# Patient Record
Sex: FEMALE | Race: WHITE | Employment: UNEMPLOYED | URBAN - METROPOLITAN AREA
[De-identification: names, ages, dates, MRNs, and addresses within clinical notes are randomized per-mention and may not be internally consistent; named-entity substitution may affect disease eponyms.]

---

## 2018-07-03 ENCOUNTER — HOSPITAL ENCOUNTER (INPATIENT)
Age: 63
LOS: 1 days | Discharge: HOME HEALTH | DRG: 811 | End: 2018-07-04
Attending: INTERNAL MEDICINE | Admitting: INTERNAL MEDICINE
Payer: COMMERCIAL

## 2018-07-03 ENCOUNTER — OFFICE VISIT (OUTPATIENT)
Dept: ENDOCRINOLOGY | Age: 63
End: 2018-07-03
Payer: COMMERCIAL

## 2018-07-03 VITALS
DIASTOLIC BLOOD PRESSURE: 52 MMHG | HEART RATE: 89 BPM | SYSTOLIC BLOOD PRESSURE: 100 MMHG | HEIGHT: 63 IN | BODY MASS INDEX: 19.14 KG/M2 | WEIGHT: 108 LBS

## 2018-07-03 DIAGNOSIS — R26.2 IMPAIRED AMBULATION: ICD-10-CM

## 2018-07-03 DIAGNOSIS — R26.81 GAIT INSTABILITY: ICD-10-CM

## 2018-07-03 DIAGNOSIS — C34.91 STAGE 4 MALIGNANT NEOPLASM OF LUNG, RIGHT (HCC): ICD-10-CM

## 2018-07-03 DIAGNOSIS — K59.03 CONSTIPATION DUE TO OPIOID THERAPY: ICD-10-CM

## 2018-07-03 DIAGNOSIS — T45.1X5S ADVERSE EFFECT OF CHEMOTHERAPY, SEQUELA: ICD-10-CM

## 2018-07-03 DIAGNOSIS — D50.8 IRON DEFICIENCY ANEMIA SECONDARY TO INADEQUATE DIETARY IRON INTAKE: ICD-10-CM

## 2018-07-03 DIAGNOSIS — G89.3 CANCER ASSOCIATED PAIN: Chronic | ICD-10-CM

## 2018-07-03 DIAGNOSIS — I47.1 PAROXYSMAL SVT (SUPRAVENTRICULAR TACHYCARDIA) (HCC): ICD-10-CM

## 2018-07-03 DIAGNOSIS — E05.90 HYPERTHYROIDISM: Primary | ICD-10-CM

## 2018-07-03 DIAGNOSIS — R52 SEVERE PAIN: ICD-10-CM

## 2018-07-03 DIAGNOSIS — M17.0 PRIMARY OSTEOARTHRITIS OF BOTH KNEES: Chronic | ICD-10-CM

## 2018-07-03 DIAGNOSIS — R11.0 CHRONIC NAUSEA: Chronic | ICD-10-CM

## 2018-07-03 DIAGNOSIS — D63.0 ANEMIA IN NEOPLASTIC DISEASE: ICD-10-CM

## 2018-07-03 DIAGNOSIS — J43.9 PULMONARY EMPHYSEMA, UNSPECIFIED EMPHYSEMA TYPE (HCC): ICD-10-CM

## 2018-07-03 DIAGNOSIS — T40.2X5A CONSTIPATION DUE TO OPIOID THERAPY: ICD-10-CM

## 2018-07-03 DIAGNOSIS — R62.7 FAILURE TO THRIVE IN ADULT: ICD-10-CM

## 2018-07-03 DIAGNOSIS — G25.81 IRON DEFICIENCY ASSOCIATED WITH NONFAMILIAL RESTLESS LEGS SYNDROME: Chronic | ICD-10-CM

## 2018-07-03 DIAGNOSIS — R73.9 HYPERGLYCEMIA: ICD-10-CM

## 2018-07-03 DIAGNOSIS — E43 SEVERE PROTEIN-CALORIE MALNUTRITION (HCC): Chronic | ICD-10-CM

## 2018-07-03 DIAGNOSIS — C34.91 PRIMARY LUNG CANCER WITH METASTASIS FROM LUNG TO OTHER SITE, RIGHT (HCC): ICD-10-CM

## 2018-07-03 DIAGNOSIS — C34.91 STAGE 4 MALIGNANT NEOPLASM OF LUNG, RIGHT (HCC): Primary | ICD-10-CM

## 2018-07-03 DIAGNOSIS — D64.9 SYMPTOMATIC ANEMIA: ICD-10-CM

## 2018-07-03 DIAGNOSIS — E44.0 MODERATE PROTEIN-CALORIE MALNUTRITION (HCC): ICD-10-CM

## 2018-07-03 DIAGNOSIS — E61.1 IRON DEFICIENCY ASSOCIATED WITH NONFAMILIAL RESTLESS LEGS SYNDROME: Chronic | ICD-10-CM

## 2018-07-03 DIAGNOSIS — G89.29 OTHER CHRONIC PAIN: ICD-10-CM

## 2018-07-03 PROBLEM — Z51.11 ENCOUNTER FOR ANTINEOPLASTIC CHEMOTHERAPY: Status: ACTIVE | Noted: 2018-04-13

## 2018-07-03 LAB
ALBUMIN SERPL-MCNC: 3 G/DL (ref 3.9–4.9)
ALP BLD-CCNC: 82 U/L (ref 40–130)
ALT SERPL-CCNC: 13 U/L (ref 0–33)
ANION GAP SERPL CALCULATED.3IONS-SCNC: 18 MEQ/L (ref 7–13)
AST SERPL-CCNC: 20 U/L (ref 0–35)
BILIRUB SERPL-MCNC: <0.2 MG/DL (ref 0–1.2)
BUN BLDV-MCNC: 20 MG/DL (ref 8–23)
CALCIUM SERPL-MCNC: 9.3 MG/DL (ref 8.6–10.2)
CHLORIDE BLD-SCNC: 93 MEQ/L (ref 98–107)
CO2: 24 MEQ/L (ref 22–29)
CREAT SERPL-MCNC: 0.82 MG/DL (ref 0.5–0.9)
FOLATE: >20 NG/ML (ref 7.3–26.1)
GFR AFRICAN AMERICAN: >60
GFR NON-AFRICAN AMERICAN: >60
GLOBULIN: 3.8 G/DL (ref 2.3–3.5)
GLUCOSE BLD-MCNC: 127 MG/DL (ref 74–109)
GLUCOSE BLD-MCNC: 130 MG/DL (ref 60–115)
GLUCOSE BLD-MCNC: 157 MG/DL (ref 60–115)
HBA1C MFR BLD: 5.8 % (ref 4.8–5.9)
HBA1C MFR BLD: 5.8 % (ref 4.8–5.9)
IRON SATURATION: 8 % (ref 11–46)
IRON: 16 UG/DL (ref 37–145)
LACTIC ACID: 1.3 MMOL/L (ref 0.5–2.2)
MAGNESIUM: 1.6 MG/DL (ref 1.7–2.3)
PERFORMED ON: ABNORMAL
PERFORMED ON: ABNORMAL
POTASSIUM SERPL-SCNC: 3.6 MEQ/L (ref 3.5–5.1)
PREALBUMIN: 16.1 MG/DL (ref 20–40)
SODIUM BLD-SCNC: 135 MEQ/L (ref 132–144)
T3 FREE: 2.4 PG/ML (ref 2–4.4)
T4 FREE: 1.67 NG/DL (ref 0.93–1.7)
TOTAL IRON BINDING CAPACITY: 192 UG/DL (ref 178–450)
TOTAL PROTEIN: 6.8 G/DL (ref 6.4–8.1)
TSH REFLEX: 0.27 UIU/ML (ref 0.27–4.2)
VITAMIN B-12: 1436 PG/ML (ref 232–1245)
VITAMIN D 25-HYDROXY: 36.4 NG/ML (ref 30–100)

## 2018-07-03 PROCEDURE — 36415 COLL VENOUS BLD VENIPUNCTURE: CPT

## 2018-07-03 PROCEDURE — 82746 ASSAY OF FOLIC ACID SERUM: CPT

## 2018-07-03 PROCEDURE — 84481 FREE ASSAY (FT-3): CPT

## 2018-07-03 PROCEDURE — 6360000002 HC RX W HCPCS: Performed by: INTERNAL MEDICINE

## 2018-07-03 PROCEDURE — 84439 ASSAY OF FREE THYROXINE: CPT

## 2018-07-03 PROCEDURE — 84134 ASSAY OF PREALBUMIN: CPT

## 2018-07-03 PROCEDURE — G8420 CALC BMI NORM PARAMETERS: HCPCS | Performed by: PHYSICIAN ASSISTANT

## 2018-07-03 PROCEDURE — 6370000000 HC RX 637 (ALT 250 FOR IP): Performed by: INTERNAL MEDICINE

## 2018-07-03 PROCEDURE — 86800 THYROGLOBULIN ANTIBODY: CPT

## 2018-07-03 PROCEDURE — 80053 COMPREHEN METABOLIC PANEL: CPT

## 2018-07-03 PROCEDURE — 83036 HEMOGLOBIN GLYCOSYLATED A1C: CPT

## 2018-07-03 PROCEDURE — 1210000000 HC MED SURG R&B

## 2018-07-03 PROCEDURE — 84432 ASSAY OF THYROGLOBULIN: CPT

## 2018-07-03 PROCEDURE — 2580000003 HC RX 258: Performed by: INTERNAL MEDICINE

## 2018-07-03 PROCEDURE — 82607 VITAMIN B-12: CPT

## 2018-07-03 PROCEDURE — 2500000003 HC RX 250 WO HCPCS: Performed by: INTERNAL MEDICINE

## 2018-07-03 PROCEDURE — 99204 OFFICE O/P NEW MOD 45 MIN: CPT | Performed by: PHYSICIAN ASSISTANT

## 2018-07-03 PROCEDURE — 83550 IRON BINDING TEST: CPT

## 2018-07-03 PROCEDURE — 83605 ASSAY OF LACTIC ACID: CPT

## 2018-07-03 PROCEDURE — 82306 VITAMIN D 25 HYDROXY: CPT

## 2018-07-03 PROCEDURE — G8427 DOCREV CUR MEDS BY ELIG CLIN: HCPCS | Performed by: PHYSICIAN ASSISTANT

## 2018-07-03 PROCEDURE — 84482 T3 REVERSE: CPT

## 2018-07-03 PROCEDURE — 83540 ASSAY OF IRON: CPT

## 2018-07-03 PROCEDURE — 83735 ASSAY OF MAGNESIUM: CPT

## 2018-07-03 PROCEDURE — 84443 ASSAY THYROID STIM HORMONE: CPT

## 2018-07-03 PROCEDURE — 94664 DEMO&/EVAL PT USE INHALER: CPT

## 2018-07-03 PROCEDURE — 3017F COLORECTAL CA SCREEN DOC REV: CPT | Performed by: PHYSICIAN ASSISTANT

## 2018-07-03 RX ORDER — POTASSIUM CHLORIDE 7.45 MG/ML
10 INJECTION INTRAVENOUS PRN
Status: DISCONTINUED | OUTPATIENT
Start: 2018-07-03 | End: 2018-07-04 | Stop reason: HOSPADM

## 2018-07-03 RX ORDER — OMEGA-3 FATTY ACIDS CAP DELAYED RELEASE 1000 MG 1000 MG
1000 CAPSULE DELAYED RELEASE ORAL DAILY
COMMUNITY

## 2018-07-03 RX ORDER — SENNA AND DOCUSATE SODIUM 50; 8.6 MG/1; MG/1
1 TABLET, FILM COATED ORAL DAILY
Status: DISCONTINUED | OUTPATIENT
Start: 2018-07-03 | End: 2018-07-04 | Stop reason: HOSPADM

## 2018-07-03 RX ORDER — MAGNESIUM SULFATE 1 G/100ML
1 INJECTION INTRAVENOUS PRN
Status: DISCONTINUED | OUTPATIENT
Start: 2018-07-03 | End: 2018-07-04 | Stop reason: HOSPADM

## 2018-07-03 RX ORDER — MAGNESIUM OXIDE 400 MG/1
400 TABLET ORAL DAILY
Status: ON HOLD | COMMUNITY
End: 2018-07-04 | Stop reason: HOSPADM

## 2018-07-03 RX ORDER — DEXTROSE MONOHYDRATE 50 MG/ML
100 INJECTION, SOLUTION INTRAVENOUS PRN
Status: DISCONTINUED | OUTPATIENT
Start: 2018-07-03 | End: 2018-07-04 | Stop reason: HOSPADM

## 2018-07-03 RX ORDER — MORPHINE SULFATE 2 MG/ML
2 INJECTION, SOLUTION INTRAMUSCULAR; INTRAVENOUS EVERY 4 HOURS PRN
Status: ACTIVE | OUTPATIENT
Start: 2018-07-03 | End: 2018-07-04

## 2018-07-03 RX ORDER — PANTOPRAZOLE SODIUM 40 MG/1
40 TABLET, DELAYED RELEASE ORAL DAILY
COMMUNITY

## 2018-07-03 RX ORDER — METHIMAZOLE 5 MG/1
5 TABLET ORAL DAILY
Qty: 30 TABLET | Refills: 4 | Status: CANCELLED | OUTPATIENT
Start: 2018-07-03 | End: 2018-08-02

## 2018-07-03 RX ORDER — MAGNESIUM SULFATE IN WATER 40 MG/ML
4 INJECTION, SOLUTION INTRAVENOUS ONCE
Status: COMPLETED | OUTPATIENT
Start: 2018-07-04 | End: 2018-07-04

## 2018-07-03 RX ORDER — POTASSIUM CHLORIDE 20MEQ/15ML
40 LIQUID (ML) ORAL PRN
Status: DISCONTINUED | OUTPATIENT
Start: 2018-07-03 | End: 2018-07-04 | Stop reason: HOSPADM

## 2018-07-03 RX ORDER — NICOTINE POLACRILEX 4 MG
15 LOZENGE BUCCAL PRN
Status: DISCONTINUED | OUTPATIENT
Start: 2018-07-03 | End: 2018-07-04 | Stop reason: HOSPADM

## 2018-07-03 RX ORDER — CHLORAL HYDRATE 500 MG
1000 CAPSULE ORAL DAILY
Status: DISCONTINUED | OUTPATIENT
Start: 2018-07-03 | End: 2018-07-04 | Stop reason: HOSPADM

## 2018-07-03 RX ORDER — DEXAMETHASONE 4 MG/1
4 TABLET ORAL 2 TIMES DAILY WITH MEALS
COMMUNITY

## 2018-07-03 RX ORDER — POTASSIUM CHLORIDE 20 MEQ/1
40 TABLET, EXTENDED RELEASE ORAL PRN
Status: DISCONTINUED | OUTPATIENT
Start: 2018-07-03 | End: 2018-07-04 | Stop reason: HOSPADM

## 2018-07-03 RX ORDER — MORPHINE SULFATE 4 MG/ML
4 INJECTION, SOLUTION INTRAMUSCULAR; INTRAVENOUS EVERY 4 HOURS PRN
Status: DISCONTINUED | OUTPATIENT
Start: 2018-07-03 | End: 2018-07-04 | Stop reason: HOSPADM

## 2018-07-03 RX ORDER — OXYCODONE HYDROCHLORIDE AND ACETAMINOPHEN 5; 325 MG/1; MG/1
2 TABLET ORAL EVERY 4 HOURS PRN
Status: DISCONTINUED | OUTPATIENT
Start: 2018-07-03 | End: 2018-07-04 | Stop reason: HOSPADM

## 2018-07-03 RX ORDER — ONDANSETRON 2 MG/ML
4 INJECTION INTRAMUSCULAR; INTRAVENOUS EVERY 6 HOURS PRN
Status: DISCONTINUED | OUTPATIENT
Start: 2018-07-03 | End: 2018-07-04 | Stop reason: HOSPADM

## 2018-07-03 RX ORDER — OXYCODONE HYDROCHLORIDE AND ACETAMINOPHEN 5; 325 MG/1; MG/1
1 TABLET ORAL EVERY 6 HOURS
Status: DISCONTINUED | OUTPATIENT
Start: 2018-07-03 | End: 2018-07-04 | Stop reason: HOSPADM

## 2018-07-03 RX ORDER — SODIUM CHLORIDE 0.9 % (FLUSH) 0.9 %
10 SYRINGE (ML) INJECTION PRN
Status: DISCONTINUED | OUTPATIENT
Start: 2018-07-03 | End: 2018-07-04 | Stop reason: HOSPADM

## 2018-07-03 RX ORDER — LACTULOSE 10 G/15ML
30 SOLUTION ORAL ONCE
Status: COMPLETED | OUTPATIENT
Start: 2018-07-03 | End: 2018-07-03

## 2018-07-03 RX ORDER — UREA 10 %
2 LOTION (ML) TOPICAL NIGHTLY
Status: DISCONTINUED | OUTPATIENT
Start: 2018-07-03 | End: 2018-07-04 | Stop reason: HOSPADM

## 2018-07-03 RX ORDER — IPRATROPIUM BROMIDE AND ALBUTEROL SULFATE 2.5; .5 MG/3ML; MG/3ML
1 SOLUTION RESPIRATORY (INHALATION) EVERY 4 HOURS PRN
Status: DISCONTINUED | OUTPATIENT
Start: 2018-07-03 | End: 2018-07-04 | Stop reason: HOSPADM

## 2018-07-03 RX ORDER — METOCLOPRAMIDE HYDROCHLORIDE 5 MG/ML
10 INJECTION INTRAMUSCULAR; INTRAVENOUS EVERY 6 HOURS PRN
Status: DISCONTINUED | OUTPATIENT
Start: 2018-07-03 | End: 2018-07-03

## 2018-07-03 RX ORDER — METOCLOPRAMIDE 10 MG/1
10 TABLET ORAL
Status: DISCONTINUED | OUTPATIENT
Start: 2018-07-03 | End: 2018-07-04 | Stop reason: HOSPADM

## 2018-07-03 RX ORDER — PROCHLORPERAZINE MALEATE 10 MG
10 TABLET ORAL EVERY 6 HOURS PRN
Status: ON HOLD | COMMUNITY
End: 2018-07-04 | Stop reason: HOSPADM

## 2018-07-03 RX ORDER — DEXTROSE MONOHYDRATE 25 G/50ML
12.5 INJECTION, SOLUTION INTRAVENOUS PRN
Status: DISCONTINUED | OUTPATIENT
Start: 2018-07-03 | End: 2018-07-04 | Stop reason: HOSPADM

## 2018-07-03 RX ORDER — THIAMINE HCL 100 MG
2500 TABLET ORAL DAILY
Status: DISCONTINUED | OUTPATIENT
Start: 2018-07-04 | End: 2018-07-04 | Stop reason: HOSPADM

## 2018-07-03 RX ORDER — DRONABINOL 2.5 MG/1
5 CAPSULE ORAL 2 TIMES DAILY
Status: DISCONTINUED | OUTPATIENT
Start: 2018-07-03 | End: 2018-07-04 | Stop reason: HOSPADM

## 2018-07-03 RX ORDER — FOLIC ACID 1 MG/1
1 TABLET ORAL DAILY
COMMUNITY

## 2018-07-03 RX ORDER — FOLIC ACID 1 MG/1
1 TABLET ORAL 2 TIMES DAILY
Status: DISCONTINUED | OUTPATIENT
Start: 2018-07-04 | End: 2018-07-04 | Stop reason: HOSPADM

## 2018-07-03 RX ORDER — SODIUM CHLORIDE 0.9 % (FLUSH) 0.9 %
10 SYRINGE (ML) INJECTION EVERY 12 HOURS SCHEDULED
Status: DISCONTINUED | OUTPATIENT
Start: 2018-07-03 | End: 2018-07-04 | Stop reason: HOSPADM

## 2018-07-03 RX ORDER — POTASSIUM CHLORIDE 20 MEQ/1
40 TABLET, EXTENDED RELEASE ORAL ONCE
Status: COMPLETED | OUTPATIENT
Start: 2018-07-04 | End: 2018-07-04

## 2018-07-03 RX ORDER — PANTOPRAZOLE SODIUM 40 MG/1
40 TABLET, DELAYED RELEASE ORAL DAILY
Status: DISCONTINUED | OUTPATIENT
Start: 2018-07-03 | End: 2018-07-04 | Stop reason: HOSPADM

## 2018-07-03 RX ORDER — DRONABINOL 2.5 MG/1
2.5 CAPSULE ORAL 2 TIMES DAILY
Status: DISCONTINUED | OUTPATIENT
Start: 2018-07-03 | End: 2018-07-03

## 2018-07-03 RX ORDER — SODIUM CHLORIDE 9 MG/ML
INJECTION, SOLUTION INTRAVENOUS CONTINUOUS
Status: DISCONTINUED | OUTPATIENT
Start: 2018-07-03 | End: 2018-07-04 | Stop reason: HOSPADM

## 2018-07-03 RX ORDER — DEXAMETHASONE 4 MG/1
4 TABLET ORAL 2 TIMES DAILY WITH MEALS
Status: DISCONTINUED | OUTPATIENT
Start: 2018-07-03 | End: 2018-07-03

## 2018-07-03 RX ADMIN — DRONABINOL 5 MG: 2.5 CAPSULE ORAL at 20:55

## 2018-07-03 RX ADMIN — LACTULOSE 30 G: 20 SOLUTION ORAL at 20:54

## 2018-07-03 RX ADMIN — OXYCODONE HYDROCHLORIDE AND ACETAMINOPHEN 1 TABLET: 5; 325 TABLET ORAL at 17:54

## 2018-07-03 RX ADMIN — ASCORBIC ACID, VITAMIN A PALMITATE, CHOLECALCIFEROL, THIAMINE HYDROCHLORIDE, RIBOFLAVIN-5 PHOSPHATE SODIUM, PYRIDOXINE HYDROCHLORIDE, NIACINAMIDE, DEXPANTHENOL, ALPHA-TOCOPHEROL ACETATE, VITAMIN K1, FOLIC ACID, BIOTIN, CYANOCOBALAMIN: 200; 3300; 200; 6; 3.6; 6; 40; 15; 10; 150; 600; 60; 5 INJECTION, SOLUTION INTRAVENOUS at 20:55

## 2018-07-03 RX ADMIN — Medication 2 MG: at 20:55

## 2018-07-03 RX ADMIN — METOCLOPRAMIDE HYDROCHLORIDE 10 MG: 10 TABLET ORAL at 17:54

## 2018-07-03 RX ADMIN — SODIUM CHLORIDE: 9 INJECTION, SOLUTION INTRAVENOUS at 17:04

## 2018-07-03 RX ADMIN — Medication 10 ML: at 20:56

## 2018-07-03 RX ADMIN — SENNOSIDES AND DOCUSATE SODIUM 1 TABLET: 8.6; 5 TABLET ORAL at 17:21

## 2018-07-03 ASSESSMENT — ENCOUNTER SYMPTOMS
SINUS PRESSURE: 0
EYE PAIN: 0
WHEEZING: 0
SHORTNESS OF BREATH: 0
VOMITING: 0
TROUBLE SWALLOWING: 0
RHINORRHEA: 0
NAUSEA: 1
CONSTIPATION: 1
SORE THROAT: 0
EYE REDNESS: 0
DIARRHEA: 0
COUGH: 0
ABDOMINAL PAIN: 0

## 2018-07-03 ASSESSMENT — PAIN SCALES - GENERAL
PAINLEVEL_OUTOF10: 5
PAINLEVEL_OUTOF10: 3

## 2018-07-03 ASSESSMENT — PAIN DESCRIPTION - LOCATION: LOCATION: KNEE

## 2018-07-03 NOTE — PROGRESS NOTES
Physical Therapy Missed Treatment   Facility/Department: [facility] MED SURG [id number]    NAME: Coreen Jenkins    : 1955 (61 y.o.)  MRN: [id number]    Account: [de-identified]  Gender: female      PT evaluation and treatment orders received. Chart reviewed. PT evaluation attempted. Pt currently with MD at bedside for assessment. Will follow and attempt PT evaluation again at earliest availability.         Robert Herron, PT, 18 at 4:00 PM

## 2018-07-03 NOTE — H&P
Hospital Medicine  History and Physical    Patient:  Blue Gamez  MRN: [id number]    CHIEF COMPLAINT:  No chief complaint on file. History Obtained From:  Patient, EMR  Primary Care Physician: Vijaya Sol MD    HISTORY OF PRESENT ILLNESS:   The patient is a 61 y.o. female with PMH of tobacco use in remission, stage IV lung CA ( [place]) on chemotherapy, SVT, DJD who presents with profound fatigue and worsening generalized weakness, loss of appetite, unintentional weight loss, nausea with vomiting and severe debilitating cancer related pains that keep her awake at night. Per  pt has been increasingly fatigued, sleeping most of the day, unable to ambulate on her own without almost falling, reports being unsteady on her feet, has intractable nausea \"gagging\" off and on throughout the day and cannot keep Zofran down due to vomiting. Decreased PO intake with noted weight loss. Sees oncology team at [facility]. Had limited work up into symptoms and was recently diagnosed with hyperthyroidism for which she was referred to Endocrinology. She was seen by Endocrinology today morning and was supposed to have further testing as outpatient, but due to severity of symptoms (episodic dizziness with near syncope, profound fatigue, loss of appetite, weight loss and heart palpitations) which has greatly impacted her quality of life negatively, ongoing nausea and poor PO intake with weight loss, and decline in functional status, pt was brought into hospital for further evaluation. Goals of care discussed and improvement in quality of life is top priority. Has grandchildren she has not been able to spend time with due current health status. Reports that the chemo has been effective in treating her Lung CA but feels she cannot live her life or function. Past Medical History:      Diagnosis Date    Cancer [facility])     lung    SVT (supraventricular tachycardia) ([facility] [address]        Past Surgical History:  History reviewed.  No pertinent surgical history. Medications Prior to Admission:    Prior to Admission medications    Medication Sig Start Date End Date Taking? Authorizing Provider   dexamethasone (DECADRON) 4 MG tablet Take 4 mg by mouth 2 times daily (with meals)   Yes Historical Provider, MD   diclofenac sodium 1 % GEL Apply 2 g topically 2 times daily   Yes Historical Provider, MD   Omega-3 Fatty Acids (FISH OIL) 1000 MG CPDR Take 1,000 mg by mouth daily   Yes Historical Provider, MD   Multiple Vitamins-Minerals (CENTRUM SILVER PO) Take by mouth   Yes Historical Provider, MD   magnesium oxide (MAG-OX) 400 MG tablet Take 400 mg by mouth daily   Yes Historical Provider, MD   metoprolol tartrate (LOPRESSOR) 25 MG tablet Take 25 mg by mouth daily as needed    Yes Historical Provider, MD   pantoprazole (PROTONIX) 40 MG tablet Take 40 mg by mouth daily   Yes Historical Provider, MD   prochlorperazine (COMPAZINE) 10 MG tablet Take 10 mg by mouth every 6 hours as needed   Yes Historical Provider, MD   folic acid (FOLVITE) 1 MG tablet Take 1 mg by mouth daily   Yes Historical Provider, MD   PEMEtrexed Disodium (ALIMTA IV) Infuse intravenously every 21 days   Yes Historical Provider, MD       Allergies:  Patient has no known allergies. Social History:   TOBACCO:   reports that she has quit smoking. She has never used smokeless tobacco.  ETOH:   has no alcohol history on file. Family History:   History reviewed. No pertinent family history. REVIEW OF SYSTEMS:  Ten systems reviewed and negative except for stated in HPI and[de-identified]  severe knee pain that keeps patient bedbound. Physical Exam:    Vitals: BP (!) 117/56   Pulse 91   Temp 98.6 °F (37 °C)   Resp 16   Ht 5' 3\" (1.6 m)   Wt 108 lb (49 kg)   SpO2 99%   BMI 19.13 kg/m²   General appearance: alert, appears stated age and cooperative, moderate acute distress  Skin: Skin color, texture, turgor decreased. No rashes or lesions  HEENT: Head: Normal, normocephalic, atraumatic.   Dry buccal mucosa, (+) temporal wasting  Neck: no adenopathy, no carotid bruit, no JVD, supple, symmetrical, trachea midline and thyroid not enlarged, symmetric, no tenderness/mass/nodules  Lungs: clear to auscultation bilaterally and diminished breath sounds LLL  Heart: regular rate and rhythm, S1, S2 normal, no murmur, click, rub or gallop  Abdomen: soft, non-tender; bowel sounds normal; no masses,  no organomegaly  Extremities: extremities normal, atraumatic, no cyanosis or edema (+) clubbing of fingers  Neurologic: Mental status: Alert, oriented, thought content appropriate     No results for input(s): WBC, HGB, PLT in the last 72 hours. No results for input(s): NA, K, CL, CO2, BUN, CREATININE, GLUCOSE, AST, ALT, ALB, BILITOT, ALKPHOS in the last 72 hours. Troponin T: No results for input(s): TROPONINI in the last 72 hours. ABGs: No results found for: PHART, PO2ART, OLO8DIN  INR: No results for input(s): INR in the last 72 hours. URINALYSIS:No results for input(s): NITRITE, COLORU, PHUR, LABCAST, WBCUA, RBCUA, MUCUS, TRICHOMONAS, YEAST, BACTERIA, CLARITYU, SPECGRAV, LEUKOCYTESUR, UROBILINOGEN, BILIRUBINUR, BLOODU, GLUCOSEU, AMORPHOUS in the last 72 hours. Invalid input(s): Tereza Vasquez  -----------------------------------------------------------------   No results found. Assessment and Plan     1. Failure to thrive in Adult in setting of Chronic illness undergoing chemotherapy for Stage IV lung CA: Admit to medicine, supplemental nutrition until nausea controlled and will advance diet as tolerated. 2. Metastatic Cancer related pain: Pain meds ordered both scheduled and PRN with hold parameters. Goal is to control with scheduled and PRN PO meds which she can continue as outpatient. Palliative care consulted. 3. Chemotherapy related intractable nausea and vomiting and likely dehydration evidenced by hypotension: Reglan PRN, TPN until able to tolerate PO diet. IV hydration.  Strict intake and output  4. Stage IV [place] Lung CA on Chemotherapy: Will consult Oncology. Palliative care consulted. 5. Suspected moderate protein calorie malnutrition with hypoalbuminemia in setting of chronic illness, active malignancy currently undergoing chemotherapy with associated anorexia, unintentional weight loss, progressing weakness, fatigue and nausea vomiting in advanced cancer: Dietitian consulted for nutritional assessment, supplemental nutrition until able to tolerate PO. IV PRN antiemetics as pt unable to tolerate PO. Will start marinol to improve appetite. Scheduled PO reglan TID AC and HS  6. Generalized weakness and fatigue with hypersomnolence: possibly nocturnal hypoxia due to metastatic lung CA; however could be insomnia due to medical condition resulting in daytime sleepiness. Will check nocturnal pulse ox tonight. Pain control. Melatonin QHS. Reassess in AM. Checking Vit D level. Maximize nutritional status,   7. Hyperglycemia likely due to decadron use however will need to rule out DMII: Hgb A1C ordered. Endocrinology consulted. 8. Subclinical Hyperthyroidism with manifestations including SVT, possibly contributing to poor po intake and additional symptoms. Endocrinology consulted and notified of admission. 9. Decreased functional Status with ataxia, at risk for falls: Fall precautions. Will consult PT OT for evaluation. 10. Diet: Encouraged attempts at PO intake as tolerated so will order  DIET GENERAL;  11. CLINIMIX until able to tolerate PO diet and meet her nutritional needs via PO diet. Calorie count. Dietitian consulted. 12. SVT: Goal K and Mg 4.0 and 2.0, respectively. Telemetry  13. Advance Directive: Full Code Spiritual care consulted to ensure Advance directive available and placed in chart. 14. Disposition: Dependent on hospital course. Will discharge once medically stable. SW on board for discharge planning.      Patient Active Problem List   Diagnosis Code    Hyperthyroidism E05.90   

## 2018-07-03 NOTE — PLAN OF CARE
Problem:  Activity:  Goal: Ability to tolerate increased activity will improve  Ability to tolerate increased activity will improve  Outcome: Ongoing    Goal: Ability to implement measures to reduce episodes of fatigue will improve  Ability to implement measures to reduce episodes of fatigue will improve  Outcome: Ongoing

## 2018-07-03 NOTE — PROGRESS NOTES
PHARMACY NOTE:  Drug Shortage Communication and Action Plan: IV Opioids    Roxi Tejada was ordered morphine 2-4 hours IV Q 4 hours PRN.  Per the [address], the appropriate stop time was implemented per pharmacy upon order verification based upon the below parameters due to recent drug shortage    IV Opioid Stop Time:  Patient with a diet 24 hour stop time YES (diet: general)    Patient NPO 72 hour stop time ---     Ray Sarkar, PharmD   7/3/2018 3:52 PM

## 2018-07-03 NOTE — PROGRESS NOTES
Last 3 Encounters:   07/03/18 108 lb (49 kg)     BP Readings from Last 3 Encounters:   07/03/18 (!) 100/52     Mrs Corine Gamez is a 59-year-old white female with a history of stage IV lung cancer diagnosed late last year, currently undergoing chemotherapy every 21 days at the [facility], most recent treatment was 7 days ago. The patient has undergone all to pull PET, CT scans with contrast dye in the last 6-8 months. A thorough review of laboratory, radiologic studies and other provider's notes has been done. She denies any history of thyroid disease, enlargement, or family history of thyroid. She is being referred to us for evaluation and management of hyperthyroidism. She has been experiencing worsening of her fatigue, cold intolerance, hair loss, sleeping 10-12 hours or more a day, poor appetite with poor by mouth intake, and weakness. The patient and her  state that she does snore most nights, and awakens gasping for air at least 4-5 nights per week. Thyroid studies showed a decreased TSH 0.033, and a free T4 1.7, at the high-end of normal.  CBC showed anemia, hemoglobin 8.6. Review of her glucose showed elevations of 161, and 148 on last two chemistry results. She eyes any history of diabetes, states however that her glucose has been slightly elevated occasionally in the last 9 months. She does take steroids which is the possible cause of her hyperglycemia. Mrs. Corine Gamez is a very complex patient with multiple comorbidities. Possible differentials for her fatigue and weakness include her stage IV cancer with chemotherapy, thyroid disease, anemia, poor by mouth nutritional intake and sleep apnea. We'll obtain a thyroid uptake scan, repeat her TSH and T4, obtain a hemoglobin A1c and see the patient back for follow-up when those results are available in approximately 4-6 weeks.   If her thyroid is hypermetabolic and laboratory studies continue to show hyperthyroidism we will consider starting heard.  Pulses:       Carotid pulses are 1+ on the right side, and 1+ on the left side. Radial pulses are 1+ on the right side, and 1+ on the left side. Dorsalis pedis pulses are 1+ on the right side, and 1+ on the left side. Posterior tibial pulses are 1+ on the right side, and 1+ on the left side. Pulmonary/Chest: Effort normal and breath sounds normal. She has no wheezes. Mediport catheter right anterior upper chest wall. Abdominal: Soft. Bowel sounds are normal. She exhibits no distension. There is no tenderness. Musculoskeletal: Normal range of motion. She exhibits no edema or tenderness. Hands:  2+ DP\PT pulses bilaterally, no wounds, lesions, or ulcerations. Lymphadenopathy:     She has no cervical adenopathy. Neurological: She is alert and oriented to person, place, and time. Skin: Skin is warm and dry. Psychiatric: She has a normal mood and affect.

## 2018-07-04 VITALS
OXYGEN SATURATION: 96 % | SYSTOLIC BLOOD PRESSURE: 109 MMHG | HEART RATE: 95 BPM | HEIGHT: 63 IN | WEIGHT: 108.9 LBS | DIASTOLIC BLOOD PRESSURE: 60 MMHG | BODY MASS INDEX: 19.3 KG/M2 | TEMPERATURE: 98.1 F | RESPIRATION RATE: 16 BRPM

## 2018-07-04 PROBLEM — G89.3 CANCER ASSOCIATED PAIN: Chronic | Status: RESOLVED | Noted: 2018-07-03 | Resolved: 2018-07-04

## 2018-07-04 PROBLEM — M17.0 OSTEOARTHRITIS OF BOTH KNEES: Chronic | Status: ACTIVE | Noted: 2018-07-04

## 2018-07-04 PROBLEM — C34.91: Chronic | Status: ACTIVE | Noted: 2018-07-03

## 2018-07-04 PROBLEM — R73.9 STEROID-INDUCED HYPERGLYCEMIA: Status: RESOLVED | Noted: 2018-07-03 | Resolved: 2018-07-04

## 2018-07-04 PROBLEM — G25.81 IRON DEFICIENCY ASSOCIATED WITH NONFAMILIAL RESTLESS LEGS SYNDROME: Chronic | Status: ACTIVE | Noted: 2018-07-04

## 2018-07-04 PROBLEM — R11.0 CHRONIC NAUSEA: Chronic | Status: RESOLVED | Noted: 2018-07-03 | Resolved: 2018-07-04

## 2018-07-04 PROBLEM — G47.01 INSOMNIA DUE TO MEDICAL CONDITION: Chronic | Status: RESOLVED | Noted: 2018-07-04 | Resolved: 2018-07-04

## 2018-07-04 PROBLEM — E61.1 IRON DEFICIENCY ASSOCIATED WITH NONFAMILIAL RESTLESS LEGS SYNDROME: Chronic | Status: ACTIVE | Noted: 2018-07-04

## 2018-07-04 PROBLEM — D50.8 IRON DEFICIENCY ANEMIA SECONDARY TO INADEQUATE DIETARY IRON INTAKE: Status: ACTIVE | Noted: 2018-07-04

## 2018-07-04 PROBLEM — E05.90 HYPERTHYROIDISM: Status: RESOLVED | Noted: 2018-07-03 | Resolved: 2018-07-04

## 2018-07-04 PROBLEM — G47.01 INSOMNIA DUE TO MEDICAL CONDITION: Chronic | Status: ACTIVE | Noted: 2018-07-04

## 2018-07-04 PROBLEM — D64.9 SYMPTOMATIC ANEMIA: Chronic | Status: ACTIVE | Noted: 2018-07-03

## 2018-07-04 PROBLEM — C34.91 PRIMARY LUNG CANCER WITH METASTASIS FROM LUNG TO OTHER SITE, RIGHT (HCC): Chronic | Status: ACTIVE | Noted: 2018-01-04

## 2018-07-04 PROBLEM — T38.0X5A STEROID-INDUCED HYPERGLYCEMIA: Status: RESOLVED | Noted: 2018-07-03 | Resolved: 2018-07-04

## 2018-07-04 PROBLEM — T38.0X5A STEROID-INDUCED HYPERGLYCEMIA: Status: ACTIVE | Noted: 2018-07-03

## 2018-07-04 LAB
ANISOCYTOSIS: ABNORMAL
ATYPICAL LYMPHOCYTE RELATIVE PERCENT: 1 %
BASOPHILS ABSOLUTE: 0.1 K/UL (ref 0–0.2)
BASOPHILS RELATIVE PERCENT: 1 %
BETA-HYDROXYBUTYRATE: 1.3 MG/DL (ref 0.2–2.8)
EOSINOPHILS ABSOLUTE: 0.1 K/UL (ref 0–0.7)
EOSINOPHILS RELATIVE PERCENT: 1 %
GLUCOSE BLD-MCNC: 118 MG/DL (ref 60–115)
GLUCOSE BLD-MCNC: 130 MG/DL (ref 60–115)
HCT VFR BLD CALC: 24.9 % (ref 37–47)
HEMOGLOBIN: 8.4 G/DL (ref 12–16)
HYPOCHROMIA: 0
LYMPHOCYTES ABSOLUTE: 2.3 K/UL (ref 1–4.8)
LYMPHOCYTES RELATIVE PERCENT: 29 %
MACROCYTES: ABNORMAL
MCH RBC QN AUTO: 32.7 PG (ref 27–31.3)
MCHC RBC AUTO-ENTMCNC: 33.5 % (ref 33–37)
MCV RBC AUTO: 97.4 FL (ref 82–100)
METAMYELOCYTES RELATIVE PERCENT: 2 %
MICROCYTES: 0
MONOCYTES ABSOLUTE: 0.8 K/UL (ref 0.2–0.8)
MONOCYTES RELATIVE PERCENT: 11.3 %
MYELOCYTE PERCENT: 2 %
NEUTROPHILS ABSOLUTE: 4.4 K/UL (ref 1.4–6.5)
NEUTROPHILS RELATIVE PERCENT: 53 %
PDW BLD-RTO: 18.6 % (ref 11.5–14.5)
PERFORMED ON: ABNORMAL
PERFORMED ON: ABNORMAL
PLATELET # BLD: 461 K/UL (ref 130–400)
PLATELET SLIDE REVIEW: ABNORMAL
POIKILOCYTES: 0
POLYCHROMASIA: 0
RBC # BLD: 2.56 M/UL (ref 4.2–5.4)
WBC # BLD: 7.7 K/UL (ref 4.8–10.8)

## 2018-07-04 PROCEDURE — 85025 COMPLETE CBC W/AUTO DIFF WBC: CPT

## 2018-07-04 PROCEDURE — G8988 SELF CARE GOAL STATUS: HCPCS

## 2018-07-04 PROCEDURE — 2580000003 HC RX 258: Performed by: INTERNAL MEDICINE

## 2018-07-04 PROCEDURE — 82010 KETONE BODYS QUAN: CPT

## 2018-07-04 PROCEDURE — 97535 SELF CARE MNGMENT TRAINING: CPT

## 2018-07-04 PROCEDURE — 36415 COLL VENOUS BLD VENIPUNCTURE: CPT

## 2018-07-04 PROCEDURE — G8987 SELF CARE CURRENT STATUS: HCPCS

## 2018-07-04 PROCEDURE — G8989 SELF CARE D/C STATUS: HCPCS

## 2018-07-04 PROCEDURE — 97165 OT EVAL LOW COMPLEX 30 MIN: CPT

## 2018-07-04 PROCEDURE — 6370000000 HC RX 637 (ALT 250 FOR IP): Performed by: INTERNAL MEDICINE

## 2018-07-04 PROCEDURE — 6360000002 HC RX W HCPCS: Performed by: INTERNAL MEDICINE

## 2018-07-04 RX ORDER — DRONABINOL 5 MG/1
5 CAPSULE ORAL
Qty: 180 CAPSULE | Refills: 0 | Status: SHIPPED | OUTPATIENT
Start: 2018-07-04 | End: 2018-12-07 | Stop reason: HOSPADM

## 2018-07-04 RX ORDER — OXYCODONE HYDROCHLORIDE AND ACETAMINOPHEN 5; 325 MG/1; MG/1
TABLET ORAL
Qty: 150 TABLET | Refills: 0 | Status: SHIPPED | OUTPATIENT
Start: 2018-07-04 | End: 2018-07-04

## 2018-07-04 RX ORDER — MULTIVIT WITH MINERALS/LUTEIN
1000 TABLET ORAL DAILY
Qty: 30 TABLET | Refills: 3 | Status: CANCELLED | OUTPATIENT
Start: 2018-07-04

## 2018-07-04 RX ORDER — METOCLOPRAMIDE 10 MG/1
10 TABLET ORAL
Qty: 120 TABLET | Refills: 3 | Status: SHIPPED | OUTPATIENT
Start: 2018-07-04 | End: 2018-12-06

## 2018-07-04 RX ORDER — HEPARIN SODIUM (PORCINE) LOCK FLUSH IV SOLN 100 UNIT/ML 100 UNIT/ML
100 SOLUTION INTRAVENOUS ONCE
Status: DISCONTINUED | OUTPATIENT
Start: 2018-07-04 | End: 2018-07-04 | Stop reason: HOSPADM

## 2018-07-04 RX ORDER — HEPARIN SODIUM (PORCINE) LOCK FLUSH IV SOLN 100 UNIT/ML 100 UNIT/ML
100 SOLUTION INTRAVENOUS ONCE
Status: DISCONTINUED | OUTPATIENT
Start: 2018-07-04 | End: 2018-07-04

## 2018-07-04 RX ORDER — ACETAMINOPHEN 500 MG
500 TABLET ORAL EVERY 6 HOURS PRN
COMMUNITY
Start: 2018-07-04

## 2018-07-04 RX ORDER — OXYCODONE HYDROCHLORIDE AND ACETAMINOPHEN 5; 325 MG/1; MG/1
TABLET ORAL
Qty: 150 TABLET | Refills: 0 | Status: SHIPPED | OUTPATIENT
Start: 2018-09-04 | End: 2018-07-05

## 2018-07-04 RX ORDER — THIAMINE HCL 100 MG
2500 TABLET ORAL DAILY
Qty: 120 TABLET | Refills: 3 | Status: CANCELLED | OUTPATIENT
Start: 2018-07-05

## 2018-07-04 RX ORDER — OXYCODONE HYDROCHLORIDE AND ACETAMINOPHEN 5; 325 MG/1; MG/1
TABLET ORAL
Qty: 150 TABLET | Refills: 0 | Status: SHIPPED | OUTPATIENT
Start: 2018-08-03 | End: 2018-07-04

## 2018-07-04 RX ORDER — LANOLIN ALCOHOL/MO/W.PET/CERES
3 CREAM (GRAM) TOPICAL NIGHTLY PRN
Qty: 90 TABLET | Refills: 3 | Status: SHIPPED | OUTPATIENT
Start: 2018-07-04

## 2018-07-04 RX ORDER — SENNA AND DOCUSATE SODIUM 50; 8.6 MG/1; MG/1
2 TABLET, FILM COATED ORAL DAILY
Qty: 60 TABLET | Refills: 3 | Status: SHIPPED | OUTPATIENT
Start: 2018-07-05

## 2018-07-04 RX ADMIN — OXYCODONE HYDROCHLORIDE AND ACETAMINOPHEN 1 TABLET: 5; 325 TABLET ORAL at 05:49

## 2018-07-04 RX ADMIN — DRONABINOL 5 MG: 2.5 CAPSULE ORAL at 12:19

## 2018-07-04 RX ADMIN — OXYCODONE HYDROCHLORIDE AND ACETAMINOPHEN 1 TABLET: 5; 325 TABLET ORAL at 00:24

## 2018-07-04 RX ADMIN — PANTOPRAZOLE SODIUM 40 MG: 40 TABLET, DELAYED RELEASE ORAL at 08:41

## 2018-07-04 RX ADMIN — OXYCODONE HYDROCHLORIDE AND ACETAMINOPHEN 1 TABLET: 5; 325 TABLET ORAL at 12:19

## 2018-07-04 RX ADMIN — SENNOSIDES AND DOCUSATE SODIUM 1 TABLET: 8.6; 5 TABLET ORAL at 08:41

## 2018-07-04 RX ADMIN — Medication 2500 MCG: at 08:40

## 2018-07-04 RX ADMIN — IRON SUCROSE 200 MG: 20 INJECTION, SOLUTION INTRAVENOUS at 14:13

## 2018-07-04 RX ADMIN — POTASSIUM CHLORIDE 40 MEQ: 20 TABLET, EXTENDED RELEASE ORAL at 00:25

## 2018-07-04 RX ADMIN — MAGNESIUM SULFATE IN WATER 4 G: 40 INJECTION, SOLUTION INTRAVENOUS at 03:39

## 2018-07-04 RX ADMIN — FOLIC ACID 1 MG: 1 TABLET ORAL at 08:40

## 2018-07-04 RX ADMIN — METOCLOPRAMIDE HYDROCHLORIDE 10 MG: 10 TABLET ORAL at 12:19

## 2018-07-04 RX ADMIN — Medication 1000 MG: at 08:40

## 2018-07-04 RX ADMIN — METOCLOPRAMIDE HYDROCHLORIDE 10 MG: 10 TABLET ORAL at 05:49

## 2018-07-04 RX ADMIN — IRON SUCROSE 500 MG: 20 INJECTION, SOLUTION INTRAVENOUS at 00:20

## 2018-07-04 ASSESSMENT — PAIN SCALES - GENERAL
PAINLEVEL_OUTOF10: 4
PAINLEVEL_OUTOF10: 6
PAINLEVEL_OUTOF10: 5
PAINLEVEL_OUTOF10: 0

## 2018-07-04 NOTE — PROGRESS NOTES
Place  [x]Time    Vision:   [x]  WFL   [] Impaired  Comments:      Hearing:  [x] WFL   [] Impaired  Comments:    Sensation:   [x] WFL   []  Impaired   Comments:      Cognition:   [x] WFL   [] Impaired  Comments:    Communication:   [x] WFL   [] Impaired  Comments:    Hand Dominance: [x] Right  [] Left    Range of Motion:  R UE AROM/PROM: [x]  WFL [] Impaired  Comments:   L UE AROM/PROM:  [x]  WFL [] Impaired  Comments:     Strength:   R UE Strength: []1    [] 2   [] 2+   [] 3   [] 3+   [x] 4   [] 4+  [] 5  Comments:   L UE Strength:  []1    [] 2   [] 2+   [] 3   [] 3+  [x] 4   [] 4+   [] 5  Comments:     Quality of Movement:  [x] Good   [] Fair   [] Poor     Coordination:  Gross motor: [x] WFL   [] Impaired   Fine motor: [x] WFL   [] Impaired     Functional Mobility:  Toilet Transfers:  Min A with wall grab bar  Bed Transfer:SBA sit to supine  Sit to stand: CGA/Min A  Bathroom<-> Chair:  Min A no AD    Seated Balance:      Static: [x] Good  [] Fair   [] Poor   Dynamic: [x]  Good-  [] Fair   [] Poor     Standing Balance:     Static: [x] Good -  [] Fair  [] Poor   Dynamic: [] Good   [x] Fair+   [] Poor     Functional Endurance: [] Good  [x] Fair  [] Poor     ADLs  Feeding:  Ind  UE Dressing:  Set up  LB Dressing:  Set up seated and standing  Bathing:  Set up  Toileting: set up  Grooming: Set up      Patient Goal: D/C home  Discussed and agreed upon: [x] Yes   [] No Comment:     Assessment/Discharge Disposition:     Performance deficits / Impairments: Decreased functional mobility , Decreased endurance, Decreased high-level IADLs, Decreased balance  Prognosis: Good  No Skilled OT: At baseline function  History: min comorb  Exam: 4 deficits  Assistance / Modification: Min A/Set up      Barriers to Improvement:  Ca      Discharge Recommendations:  Therapy (HHC)    Six Click Score  How much help for putting on and taking off regular lower body clothing?: A Little  How much help for Bathing?: A Little  How much help for

## 2018-07-04 NOTE — DISCHARGE INSTR - DIET

## 2018-07-04 NOTE — PROGRESS NOTES
[facility] Respiratory Therapy Evaluation   Current Order:  Duoneb Q4 PRN      Home Regimen: PRN per patient      Ordering Physician: Timur Montoya  Re-evaluation Date:  N/A     Diagnosis: Nausea      Patient Status: Stable / Unstable + Physician notified    The following MDI Criteria must be met in order to convert aerosol to MDI with spacer. If unable to meet, MDI will be converted to aerosol:  []  Patient able to demonstrate the ability to use MDI effectively  []  Patient alert and cooperative  []  Patient able to take deep breath with 5-10 second hold  []  Medication(s) available in this delivery method   []  Peak flow greater than or equal to 200 ml/min            Current Order Substituted To  (same drug, same frequency)   Aerosol to MDI [] Albuterol Sulfate 0.083% unit dose by aerosol Albuterol Sulfate MDI 2 puffs by inhalation with spacer    [] Levalbuterol 1.25 mg unit dose by aerosol Levalbuterol MDI 2 puffs by inhalation with spacer    [] Levalbuterol 0.63 mg unit dose by aerosol Levalbuterol MDI 2 puffs by inhalation with spacer    [] Ipratropium Bromide 0.02% unit dose by aerosol Ipratropium Bromide MDI 2 puffs by inhalation with spacer    [] Duoneb (Ipratropium + Albuterol) unit dose by aerosol Ipratropium MDI + Albuterol MDI 2 puffs by inhalation w/spacer   MDI to Aerosol [] Albuterol Sulfate MDI Albuterol Sulfate 0.083% unit dose by aerosol    [] Levalbuterol MDI 2 puffs by inhalation Levalbuterol 1.25 mg unit dose by aerosol    [] Ipratropium Bromide MDI by inhalation Ipratropium Bromide 0.02% unit dose by aerosol    [] Combivent (Ipratropium + Albuterol) MDI by inhalation Duoneb (Ipratropium + Albuterol) unit dose by aerosol   Treatment Assessment [Frequency/Schedule]:  Change frequency to: ____No change______________________________________________per Protocol, P&T, MEC      Points 0 1 2 3 4   Pulmonary Status  Non-Smoker  []   Smoking history   < 20 pack years  []   Smoking history  ?  20 pack

## 2018-07-04 NOTE — PROGRESS NOTES
Hospitalist Progress Note  7/4/2018 12:27 PM      Assessment and Plan:   1. Iron Deficiency Anemia with symptomatic Anemia and macrocytic anemia due to nutritional deficiency due to chronic nausea associated with chemotherapy and moderate malnutrition: likely nutritional deficiency not chronic blood loss but checking FOBT.(FOBT Negative) Infused venofer overnight and today. 2.  Failure to thrive in Adult in setting of Chronic illness undergoing chemotherapy for Stage IV lung CA: supplemental nutrition until nausea controlled and will advance diet as tolerated. 3. Metastatic Cancer related pain: Pain meds ordered both scheduled and PRN with hold parameters. Goal is to control with scheduled and PRN PO meds which she can continue as outpatient. Palliative care consulted. 4. Chemotherapy related intractable nausea and vomiting and likely dehydration evidenced by hypotension: Reglan PRN, TPN until able to tolerate PO diet. IV hydration. Strict intake and output  5. Stage IV [place] Lung CA on Chemotherapy: Will consult Oncology. Palliative care consulted. 6. Suspected moderate protein calorie malnutrition with hypoalbuminemia in setting of chronic illness, active malignancy currently undergoing chemotherapy with associated anorexia, unintentional weight loss, progressing weakness, fatigue and nausea vomiting in advanced cancer: Dietitian consulted for nutritional assessment, supplemental nutrition until able to tolerate PO. IV PRN antiemetics as pt unable to tolerate PO. Will start marinol to improve appetite. Scheduled PO reglan TID AC and HS  7. Generalized weakness and fatigue with hypersomnolence: possibly nocturnal hypoxia due to metastatic lung CA; however could be insomnia due to medical condition resulting in daytime sleepiness. Will check nocturnal pulse ox tonight. Pain control. Melatonin QHS. Reassess in AM. Checking Vit D level. Maximize nutritional status,   8.  Hyperglycemia likely due to decadron use however will need to rule out DMII: Hgb A1C ordered. Endocrinology consulted. 9. Subclinical Hyperthyroidism with manifestations including SVT, possibly contributing to poor po intake and additional symptoms. Endocrinology consulted and notified of admission. 10. Hypomagnesemia: Replacing via IV. 11. Decreased functional Status with ataxia, at risk for falls: Fall precautions. Will consult PT OT for evaluation. 12. Diet: Encouraged attempts at PO intake as tolerated so will order  DIET GENERAL;  13. CLINIMIX until able to tolerate PO diet and meet her nutritional needs via PO diet. Calorie count. Dietitian consulted. Marinol ordered for chemotherapy related anorexia  14. SVT likely related to iron deficiency: Goal K and Mg 4.0 and 2.0, respectively. Telemetry  15. Functional Status: Fall precautions. 16. Bowel Regimen: stool softners PRN ordered   17. Diet: DIET GENERAL;  18. PN-Adult Premix 4.25/10 - Peripheral Line  19. Dietary Nutrition Supplements: Standard High Calorie Oral Supplement  20. Advance Directive: Full Code   21. DVT prophylaxis: Lovenox refused by pt,  SCDs recommended  22. Discharge planning: SW on board. Will discharge once medically stable and cleared by all consultants. 23. High Risk Readmission Screening Tool Score Noted. 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  *      ** Total time spent reviewing medical records, evaluating patient, speaking

## 2018-07-04 NOTE — PROGRESS NOTES
Assessment completed and documented. A + O x 4, able to state needs. Denies any SOB/N/V/D. Respirations even and unlabored. Nocturnal SPO2 in place. Medicated for pain as per order. No distress noted, call light within reach. Clinimix infusing, tolerated well.

## 2018-07-04 NOTE — CONSULTS
Consults   Hematology/Oncology Consult  Encounter Date: 7/3/2018 10:05 PM    Ms. Erica Morrissey is a 61 y.o. female  : 1955  MRN: [id number]  [facility]  Requesting Provider: Dr. Daisy Davidson    Reason for request: Lung CA, stage IV      CONSULTANT: Roger Kim    HPI: The pt has AdenoCA of the lung stage 4 . She has been under the care of [facility] oncologist Dr. Law Roberts. She received 6 cycles of carboplatin and pemetrexed  chemotx with good response on recent imaging. The pt was then given maintenance pemetrexed every 3 weeks. Her next cycle is scheduled for 18. The pt developed worsening generalized weakness over the past few weeks. She had anorexia and N/V and lost 20 lbs over past 6 months. No change in mild intermittent headaches  And dizziness. She has stable mild cough and HAYES. No gross bleeding. No fever. She has bilateral knee pain  For which she tales analgesics but she has avoided NSAIDs because of Hx of epigastric discomfort. The pt was admitted as a direct admission. Her nausea is under control with Reglan . She was also started on nutritional supplementation with Clinimix. and was also given IV fluids for dehydration. Patient Active Problem List   Diagnosis    Hyperthyroidism    Anemia    Hyperglycemia    Stage 4 malignant neoplasm of lung, right (HCC)    Failure to thrive in adult    Constipation    Acquired cyst of kidney    Encounter for antineoplastic chemotherapy    Other and unspecified ovarian cyst    Primary lung cancer with metastasis from lung to other site, right ([address]    SVT (supraventricular tachycardia) ([address]    Tobacco use disorder    Congenital anomaly of gallbladder, bile ducts, and liver    Cancer associated pain    Chronic nausea     Past Medical History:   Diagnosis Date    Cancer ([address]     lung    SVT (supraventricular tachycardia) ([address]      History reviewed. No pertinent surgical history. History reviewed.  No pertinent family daily      Multiple Vitamins-Minerals (CENTRUM SILVER PO) Take by mouth      magnesium oxide (MAG-OX) 400 MG tablet Take 400 mg by mouth daily      metoprolol tartrate (LOPRESSOR) 25 MG tablet Take 25 mg by mouth daily as needed       pantoprazole (PROTONIX) 40 MG tablet Take 40 mg by mouth daily      prochlorperazine (COMPAZINE) 10 MG tablet Take 10 mg by mouth every 6 hours as needed      folic acid (FOLVITE) 1 MG tablet Take 1 mg by mouth daily      PEMEtrexed Disodium (ALIMTA IV) Infuse intravenously every 21 days       No Known Allergies      ROS:  Unremarkable except for symptoms mentioned in HPI. PHYSICAL EXAMINATION:   VITAL SIGNS: BP (!) 120/55   Pulse 104   Temp 99 °F (37.2 °C) (Oral)   Resp 14   Ht 5' 3\" (1.6 m)   Wt 108 lb (49 kg)   SpO2 96%   BMI 19.13 kg/m²       GENERAL: In no acute distress, alert and oriented to person place and time. SKIN: Warm and dry, without jaundice, ecchymoses, or petechiae. HEENT: Normocephalic, sclera anicteric, oral mucosa moist without lesion or exudate in the visible oral cavity or oropharynx,no epistaxis  NECK: supple; no JVD; no thyromegaly  NODES: No palpable adenopathy in the neck, bilateral supraclavicular fossae, axillary chains, or inguinal regions. CHEST: + infusaport on right upper chest wall  LUNGS: Good inspiratory effort, no accessory muscle use, mildly decreased BS  bilaterally, no focal wheeze, rales or rhonchi. CARDIAC: Regular rate and rhythm, normal HS  ABDOMINAL: Normal bowel sounds present, soft, non-tender, no mass or  organomegaly. MUSKL: No tenderness over spine or ribs  EXTREMITIES:no pedal edema or calf tenderness  NEUROLOGIC: Gait not tested. No grossly apparent focal deficits.   PSYCH: cooperative; appropriate behavior    LAB RESULTS:  Recent Results (from the past 24 hour(s))   Lactic acid, plasma    Collection Time: 07/03/18  3:58 PM   Result Value Ref Range    Lactic Acid 1.3 0.5 - 2.2 mmol/L   Hemoglobin A1C Collection Time: 07/03/18  9:44 PM   Result Value Ref Range    POC Glucose 157 (H) 60 - 115 mg/dl    Performed on ACCU-CHEK      Recent Labs      07/03/18   1600   GLUCOSE  127*            RADIOLOGY RESULTS:  No results found. ASSESSMENT AND PLAN  1. The pt has Stage IV adenoCA of the lung which responded well to carboplatin and pemetrexed chemotx. She is now on maintenance pemetrexed but now has symptoms such as N/V and anorexia with poor oral intake and associated dehydration which are partly side-effects from the chemotx but may also be due to her underlying malignancy. I agree with the adjustment of anti-emetic tx as well as plan for nutritional supplementation. Cont  daily oral folate supplement. Also continue B12 injections evry 9 weeks already being given during every 3rd Pemetrexed cycle. 2. Bilateral knee pain may be osteoarthropathy related to her lung CA. The pt is not able to tolerate NSAIDs but pain is controlled by oxycodone prn  3. Malnutrition related to her metastatic lung CA. Pt receiving nutritional supplementation  4. N/V due to chemotx- clinically under better control with Reglan. 5. 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 She also has component of Fe deficiency anemia. Monitor CBC and stool for occult blood. Consider IV Venofer after checking CBC in AM.      Thank you, Dr. aDisy Davidson for this consultation. Please call me if you have questions. The pt was advised to follow-up with her oncologist Dr. Law Roberts at [facility] after discharge from [facility].                   Electronically signed by Tru Recinos MD on 7/3/2018 at 10:05 PM

## 2018-07-04 NOTE — DISCHARGE SUMMARY
MG per tablet  Maximum dose of acetaminophen is 4000 mg from all sources in 24 hours. Earliest Fill Date: 9/4/18             pantoprazole (PROTONIX) 40 MG tablet  Take 40 mg by mouth daily             PEMEtrexed Disodium (ALIMTA IV)  Infuse intravenously every 21 days             sennosides-docusate sodium (SENOKOT-S) 8.6-50 MG tablet  Take 2 tablets by mouth daily Skip dose for diarrhea                 Disposition:   Discharged to Home. Any [facility] needs that were indicated and/or required as been addressed and set up by Social Work. Condition at discharge: Pt was medically stable at the time of discharge. Activity: activity as tolerated    Total time taken for discharging this patient: 40 minutes. Greater than 70% of time was spent focused exclusively on this patient. Time was taken to review chart, discuss plans with consultants, reconciling medications, discussing plan answering questions with patient.      Signed:  Diaz Ramírez  7/4/2018, 2:48 PM  ----------------------------------------------------------------------------------------------------------------------

## 2018-07-05 ENCOUNTER — TELEPHONE (OUTPATIENT)
Dept: PALLATIVE CARE | Age: 63
End: 2018-07-05

## 2018-07-05 PROBLEM — E43 SEVERE PROTEIN-CALORIE MALNUTRITION (HCC): Chronic | Status: ACTIVE | Noted: 2018-07-05

## 2018-07-05 LAB
THYROGLOBULIN AB: <0.9 IU/ML (ref 0–4)
THYROGLOBULIN BY LC-MS/MS, SERUM/PLASMA: NORMAL NG/ML (ref 1.3–31.8)
THYROGLOBULIN, SERUM OR PLASMA: 25.3 NG/ML (ref 1.3–31.8)

## 2018-07-05 RX ORDER — OXYCODONE HYDROCHLORIDE AND ACETAMINOPHEN 5; 325 MG/1; MG/1
TABLET ORAL
Qty: 155 TABLET | Refills: 0 | Status: SHIPPED | OUTPATIENT
Start: 2018-08-01 | End: 2018-07-05

## 2018-07-05 RX ORDER — OXYCODONE HYDROCHLORIDE AND ACETAMINOPHEN 5; 325 MG/1; MG/1
TABLET ORAL
Qty: 150 TABLET | Refills: 0 | Status: SHIPPED | OUTPATIENT
Start: 2018-09-01 | End: 2018-07-06

## 2018-07-06 RX ORDER — OXYCODONE HYDROCHLORIDE AND ACETAMINOPHEN 5; 325 MG/1; MG/1
TABLET ORAL
Qty: 150 TABLET | Refills: 0 | Status: SHIPPED | OUTPATIENT
Start: 2018-08-03 | End: 2018-07-06

## 2018-07-06 RX ORDER — OXYCODONE HYDROCHLORIDE AND ACETAMINOPHEN 5; 325 MG/1; MG/1
TABLET ORAL
Qty: 150 TABLET | Refills: 0 | Status: SHIPPED | OUTPATIENT
Start: 2018-08-02 | End: 2018-07-06

## 2018-07-06 RX ORDER — OXYCODONE HYDROCHLORIDE AND ACETAMINOPHEN 5; 325 MG/1; MG/1
TABLET ORAL
Qty: 150 TABLET | Refills: 0 | Status: SHIPPED | OUTPATIENT
Start: 2018-09-01 | End: 2018-12-06

## 2018-07-07 LAB — T3 REVERSE: 23.3 NG/DL (ref 9–27)

## 2018-08-02 PROBLEM — Z51.11 ENCOUNTER FOR ANTINEOPLASTIC CHEMOTHERAPY: Status: RESOLVED | Noted: 2018-04-13 | Resolved: 2018-08-02

## 2018-12-06 RX ORDER — OXYCODONE HYDROCHLORIDE AND ACETAMINOPHEN 5; 325 MG/1; MG/1
1 TABLET ORAL EVERY 6 HOURS PRN
Qty: 150 TABLET | Refills: 0 | Status: SHIPPED | OUTPATIENT
Start: 2018-12-06 | End: 2018-12-06

## 2018-12-06 RX ORDER — LACTULOSE 10 G/15ML
20 SOLUTION ORAL 2 TIMES DAILY
Qty: 1 BOTTLE | Refills: 2 | Status: SHIPPED | OUTPATIENT
Start: 2018-12-06 | End: 2019-04-16 | Stop reason: SDUPTHER

## 2018-12-06 RX ORDER — OXYMORPHONE HYDROCHLORIDE 5 MG/1
5 TABLET, FILM COATED, EXTENDED RELEASE ORAL EVERY 12 HOURS
Qty: 60 TABLET | Refills: 0 | Status: SHIPPED | OUTPATIENT
Start: 2018-12-06 | End: 2018-12-07

## 2018-12-06 RX ORDER — SUCRALFATE 1 G/1
1 TABLET ORAL 4 TIMES DAILY
Qty: 120 TABLET | Refills: 5 | Status: SHIPPED | OUTPATIENT
Start: 2018-12-06

## 2018-12-06 RX ORDER — OXYCODONE HYDROCHLORIDE AND ACETAMINOPHEN 5; 325 MG/1; MG/1
1 TABLET ORAL EVERY 6 HOURS PRN
Qty: 150 TABLET | Refills: 0 | Status: SHIPPED | OUTPATIENT
Start: 2019-01-04 | End: 2018-12-06

## 2018-12-06 RX ORDER — METOCLOPRAMIDE 10 MG/1
10 TABLET ORAL
Qty: 120 TABLET | Refills: 3 | Status: SHIPPED | OUTPATIENT
Start: 2018-12-06

## 2018-12-06 RX ORDER — OXYCODONE HYDROCHLORIDE AND ACETAMINOPHEN 5; 325 MG/1; MG/1
1 TABLET ORAL EVERY 6 HOURS PRN
Qty: 150 TABLET | Refills: 0 | Status: SHIPPED | OUTPATIENT
Start: 2019-02-02 | End: 2019-03-04

## 2018-12-07 RX ORDER — OXYMORPHONE HYDROCHLORIDE 5 MG/1
5 TABLET, FILM COATED, EXTENDED RELEASE ORAL EVERY 12 HOURS
Qty: 60 TABLET | Refills: 0 | Status: SHIPPED | OUTPATIENT
Start: 2019-02-05 | End: 2019-01-02

## 2018-12-07 RX ORDER — NALOXONE HYDROCHLORIDE 4 MG/.1ML
1 SPRAY NASAL PRN
Qty: 1 EACH | Refills: 2 | Status: SHIPPED | OUTPATIENT
Start: 2018-12-07

## 2018-12-07 RX ORDER — OXYMORPHONE HYDROCHLORIDE 5 MG/1
5 TABLET, FILM COATED, EXTENDED RELEASE ORAL EVERY 12 HOURS
Qty: 60 TABLET | Refills: 0 | Status: SHIPPED | OUTPATIENT
Start: 2019-01-05 | End: 2018-12-07

## 2019-01-01 PROBLEM — R00.2 PALPITATIONS: Status: ACTIVE | Noted: 2018-12-18

## 2019-01-01 PROBLEM — R00.0 TACHYCARDIA: Status: ACTIVE | Noted: 2018-12-18

## 2019-01-01 PROBLEM — Z51.12 ENCOUNTER FOR ANTINEOPLASTIC IMMUNOTHERAPY: Status: ACTIVE | Noted: 2018-10-16

## 2019-01-01 PROBLEM — R77.8 ELEVATED TROPONIN: Status: ACTIVE | Noted: 2018-12-18

## 2019-01-01 PROBLEM — K59.00 CONSTIPATION: Status: ACTIVE | Noted: 2018-07-20

## 2019-01-01 PROBLEM — E83.42 HYPOMAGNESEMIA: Status: ACTIVE | Noted: 2018-12-18

## 2019-01-02 RX ORDER — OXYMORPHONE HYDROCHLORIDE 10 MG/1
10 TABLET, FILM COATED, EXTENDED RELEASE ORAL EVERY 12 HOURS
Qty: 60 TABLET | Refills: 0 | Status: SHIPPED | OUTPATIENT
Start: 2019-01-05 | End: 2019-01-02

## 2019-01-02 RX ORDER — OXYMORPHONE HYDROCHLORIDE 7.5 MG/1
7.5 TABLET, FILM COATED, EXTENDED RELEASE ORAL EVERY 12 HOURS
Qty: 60 TABLET | Refills: 0 | Status: SHIPPED | OUTPATIENT
Start: 2019-01-05 | End: 2019-02-20

## 2019-01-31 PROBLEM — R77.8 ELEVATED TROPONIN: Status: RESOLVED | Noted: 2018-12-18 | Resolved: 2019-01-31

## 2019-02-20 RX ORDER — OXYMORPHONE HYDROCHLORIDE 7.5 MG/1
7.5 TABLET, FILM COATED, EXTENDED RELEASE ORAL EVERY 12 HOURS
Qty: 60 TABLET | Refills: 0 | Status: SHIPPED | OUTPATIENT
Start: 2019-02-20 | End: 2019-02-20

## 2019-02-20 RX ORDER — OXYMORPHONE HYDROCHLORIDE 7.5 MG/1
7.5 TABLET, FILM COATED, EXTENDED RELEASE ORAL EVERY 12 HOURS
Qty: 60 TABLET | Refills: 0 | Status: SHIPPED | OUTPATIENT
Start: 2019-04-23 | End: 2019-05-23

## 2019-02-20 RX ORDER — OXYMORPHONE HYDROCHLORIDE 7.5 MG/1
7.5 TABLET, FILM COATED, EXTENDED RELEASE ORAL EVERY 12 HOURS
Qty: 60 TABLET | Refills: 0 | Status: SHIPPED | OUTPATIENT
Start: 2019-03-23 | End: 2019-02-20

## 2019-04-16 RX ORDER — LACTULOSE 10 G/15ML
20 SOLUTION ORAL 2 TIMES DAILY
Qty: 1 BOTTLE | Refills: 2 | Status: SHIPPED | OUTPATIENT
Start: 2019-04-16